# Patient Record
Sex: FEMALE | Race: WHITE | NOT HISPANIC OR LATINO | Employment: PART TIME | URBAN - METROPOLITAN AREA
[De-identification: names, ages, dates, MRNs, and addresses within clinical notes are randomized per-mention and may not be internally consistent; named-entity substitution may affect disease eponyms.]

---

## 2017-07-10 ENCOUNTER — PROCEDURE VISIT (OUTPATIENT)
Dept: OBSTETRICS AND GYNECOLOGY | Facility: CLINIC | Age: 27
End: 2017-07-10

## 2017-07-10 VITALS
HEIGHT: 70 IN | WEIGHT: 148 LBS | TEMPERATURE: 98.1 F | SYSTOLIC BLOOD PRESSURE: 110 MMHG | DIASTOLIC BLOOD PRESSURE: 74 MMHG | BODY MASS INDEX: 21.19 KG/M2

## 2017-07-10 DIAGNOSIS — L94.1 LINEAR MORPHEA: ICD-10-CM

## 2017-07-10 DIAGNOSIS — Z01.419 WELL FEMALE EXAM WITH ROUTINE GYNECOLOGICAL EXAM: Primary | ICD-10-CM

## 2017-07-10 DIAGNOSIS — Z30.431 IUD CHECK UP: ICD-10-CM

## 2017-07-10 PROCEDURE — 99395 PREV VISIT EST AGE 18-39: CPT | Performed by: OBSTETRICS & GYNECOLOGY

## 2017-07-10 NOTE — PROGRESS NOTES
Subjective   Chief Complaint   Patient presents with   • Gynecologic Exam     Pt. is here for her annual preventative exam and pap test. Pt. has no complaints today, doing well.  Pt. has the Paragard.      Frances Francis is a 27 y.o. year old  presenting to be seen for her annual exam. Patient is using a ParaGard IUD for birth control   and has no complaints.  SEXUAL Hx:  She is currently sexually active.  In the past year there has not been new sexual partners.    Condoms are not typically used.  She would not like to be screened for STD's at today's exam.  Current birth control method: IUD - Paraguard.  She is happy with her current method of contraception and does not want to discuss alternative methods of contraception.  MENSTRUAL Hx:  Patient's last menstrual period was 2017 (exact date).  In the past 6 months her cycles have been regular, predictable and occur monthly.  Her menstrual flow is normal.   Each month on average there are roughly 2 day(s) of very heavy flow.    Intermenstrual bleeding is absent.    Post-coital bleeding is absent.  Dysmenorrhea: is not affecting her activities of daily living  PMS: is not affecting her activities of daily living  Her cycles are not a source of concern for her that she wishes to discuss today.  HEALTH Hx:  She exercises regularly:yes.  She wears her seat belt:yes.  She has concerns about domestic violence: no.  OTHER COMPLAINTS:  Nothing else    The following portions of the patient's history were reviewed and updated as appropriate:problem list, current medications, allergies, past family history, past medical history, past social history and past surgical history.    Smoking status: Never Smoker                                                              Smokeless status: Never Used                        Review of Systems  Review of Systems - History obtained from the patient  General ROS: negative  Psychological ROS: negative  ENT ROS: negative  Allergy  "and Immunology ROS: negative  Hematological and Lymphatic ROS: negative  Endocrine ROS: negative  Breast ROS: negative for breast lumps  Respiratory ROS: no cough, shortness of breath, or wheezing  Cardiovascular ROS: no chest pain or dyspnea on exertion  Gastrointestinal ROS: no abdominal pain, change in bowel habits, or black or bloody stools  Genito-Urinary ROS: no dysuria, trouble voiding, or hematuria  Musculoskeletal ROS: negative  Neurological ROS: no TIA or stroke symptoms  Dermatological ROS: positive for skin lesion changes and morphea on right leg        Objective   /74 (BP Location: Right arm, Patient Position: Sitting, Cuff Size: Adult)  Temp 98.1 °F (36.7 °C) (Oral)   Ht 70\" (177.8 cm)  Wt 148 lb (67.1 kg)  LMP 06/30/2017 (Exact Date)  Breastfeeding? No  BMI 21.24 kg/m2    General:  well developed; well nourished  no acute distress   Skin:  No suspicious lesions seen  Discoloration and thickening of the skin on the right thigh in the groin area   Thyroid: normal to inspection and palpation   Breasts:  Examined in supine position  Symmetric without masses or skin dimpling  Nipples normal without inversion, lesions or discharge  There are no palpable axillary nodes   Abdomen: soft, non-tender; no masses  no umbilical or inginual hernias are present  no hepato-splenomegaly   Heart: regular rate and rhythm, S1, S2 normal, no murmur, click, rub or gallop   Lungs: clear to auscultation   Pelvis: Clinical staff was present for exam  External genitalia:  normal appearance of the external genitalia including Bartholin's and [place]'s glands.  :  urethral meatus normal; urethral hypermobility is absent.  Vaginal:  normal pink mucosa without prolapse or lesions.  Cervix:  normal appearance. IUD string present - 2.5 cms in length; Pap was done  Uterus:  normal size, shape and consistency. anteverted;  Adnexa:  normal bimanual exam of the adnexa.  Rectal:  digital rectal exam not performed; anus " visually normal appearing.          Assessment/Plan   Frances was seen today for gynecologic exam.    Diagnoses and all orders for this visit:    Well female exam with routine gynecological exam  -     Liquid-based Pap Smear, Screening    IUD check up    Linear morphea       Return in 1 year    This note was electronically signed.    Jack Cedeno MD   July 10, 2017

## 2017-07-17 ENCOUNTER — TELEPHONE (OUTPATIENT)
Dept: OBSTETRICS AND GYNECOLOGY | Facility: CLINIC | Age: 27
End: 2017-07-17

## 2017-07-17 NOTE — TELEPHONE ENCOUNTER
Notified patient of pa results, Needs a Colpo.  Pt states she will call back and schedule colpo 2 wks after her next period.

## 2017-08-09 ENCOUNTER — OFFICE VISIT (OUTPATIENT)
Dept: OBSTETRICS AND GYNECOLOGY | Facility: CLINIC | Age: 27
End: 2017-08-09

## 2017-08-09 VITALS
WEIGHT: 148 LBS | SYSTOLIC BLOOD PRESSURE: 120 MMHG | DIASTOLIC BLOOD PRESSURE: 66 MMHG | BODY MASS INDEX: 21.24 KG/M2 | HEART RATE: 56 BPM

## 2017-08-09 DIAGNOSIS — R87.613 HGSIL (HIGH GRADE SQUAMOUS INTRAEPITHELIAL LESION) ON PAP SMEAR OF CERVIX: Primary | ICD-10-CM

## 2017-08-09 PROCEDURE — 57455 BIOPSY OF CERVIX W/SCOPE: CPT | Performed by: OBSTETRICS & GYNECOLOGY

## 2017-08-09 RX ORDER — LANOLIN ALCOHOL/MO/W.PET/CERES
400 CREAM (GRAM) TOPICAL DAILY
COMMUNITY

## 2017-08-09 NOTE — PROGRESS NOTES
Colposcopy    Date of procedure:  8/9/2017   Risks and benefits discussed? yes   All questions answered? yes   Consents given by: patient   Written consent obtained? yes   Pre-op indication: HGSIL- moderate and severe dysplasia  Positive HPV, -16 and 18           Procedure documentation:  The cervix was initially viewed colposcopically through a green filter.  The cervix was next bathed in acetic acid.   The findings were as follows:    Transformation zone seen? adequate   Findings: 1. Acetowhite noted at 1 o'clock and 5 o'clock  2. Abnormal vessels noted at 1 o'clock and 5 o'clock   Ectocervical biopsies: taken from 1 o'clock and 5 o'clock.  Monsels solution was applied to the biopsy sites.   Endocervical curettage: not performed   IUD strings seen                Colposcopic Impression: 1. Adequate colposcopy  2. ANA PAULA II  3. Colposcopic findings are consistent with PAP       Plan: Will base further treatment on pathology results  Post biopsy instructions given to patient.  Specimens labelled and sent to pathology.  follow-up in 1 week(s) for Biopsy results, patient has a ParaGard IUD for birth control          This note was electronically signed.    Jack Cedeno MD  August 9, 2017

## 2017-08-15 ENCOUNTER — TELEPHONE (OUTPATIENT)
Dept: OBSTETRICS AND GYNECOLOGY | Facility: CLINIC | Age: 27
End: 2017-08-15

## 2017-08-15 NOTE — TELEPHONE ENCOUNTER
Patient was called about cervical biopsy report and a message was left return the call.    Jack Cedeno MD

## 2017-08-16 ENCOUNTER — OFFICE VISIT (OUTPATIENT)
Dept: OBSTETRICS AND GYNECOLOGY | Facility: CLINIC | Age: 27
End: 2017-08-16

## 2017-08-16 VITALS
DIASTOLIC BLOOD PRESSURE: 62 MMHG | WEIGHT: 149 LBS | HEART RATE: 56 BPM | BODY MASS INDEX: 21.38 KG/M2 | SYSTOLIC BLOOD PRESSURE: 100 MMHG

## 2017-08-16 DIAGNOSIS — D06.9 SEVERE DYSPLASIA OF CERVIX (CIN III): Primary | ICD-10-CM

## 2017-08-16 PROCEDURE — 99212 OFFICE O/P EST SF 10 MIN: CPT | Performed by: OBSTETRICS & GYNECOLOGY

## 2017-08-16 NOTE — PROGRESS NOTES
Subjective   Frances Francis is a 27 y.o. female.     History of Present Illness  Patient has severe dysplasia on colposcopic directed biopsy.  She has a Mirena IUD for birth control.  Patient returns today to discuss options for treatment of severe dysplasia.  The patient will decide between office LEEP cone our outpatient cold knife cone.  She will decide to remove the IUD or leave it in place.  The following portions of the patient's history were reviewed and updated as appropriate: allergies, current medications, past family history, past medical history, past social history, past surgical history and problem list.    Review of Systems    Objective   Physical Exam   Constitutional: She appears well-developed and well-nourished.   Genitourinary: Vagina normal. Pelvic exam was performed with patient supine. No labial fusion. There is no rash, tenderness, lesion or injury on the right labia. There is no rash, tenderness, lesion or injury on the left labia.       Nursing note and vitals reviewed.      Assessment/Plan   Frances was seen today for abnormal pap smear.    Diagnoses and all orders for this visit:    Severe dysplasia of cervix (ANA PAULA III)    Patient has decided to do a LEEP cone in office with removal of the IUD prior to the procedure.  The details of the procedure were discussed.  The patient has decided to schedule the procedure    Jack Cedeno MD

## 2017-09-11 ENCOUNTER — OFFICE VISIT (OUTPATIENT)
Dept: OBSTETRICS AND GYNECOLOGY | Facility: CLINIC | Age: 27
End: 2017-09-11

## 2017-09-11 VITALS
HEIGHT: 70 IN | WEIGHT: 148 LBS | HEART RATE: 64 BPM | SYSTOLIC BLOOD PRESSURE: 112 MMHG | DIASTOLIC BLOOD PRESSURE: 70 MMHG | BODY MASS INDEX: 21.19 KG/M2

## 2017-09-11 DIAGNOSIS — D06.9 SEVERE DYSPLASIA OF CERVIX (CIN III): Primary | ICD-10-CM

## 2017-09-11 DIAGNOSIS — Z30.432 ENCOUNTER FOR IUD REMOVAL: ICD-10-CM

## 2017-09-11 PROCEDURE — 57460 BX OF CERVIX W/SCOPE LEEP: CPT | Performed by: OBSTETRICS & GYNECOLOGY

## 2017-09-11 PROCEDURE — 58301 REMOVE INTRAUTERINE DEVICE: CPT | Performed by: OBSTETRICS & GYNECOLOGY

## 2017-09-11 NOTE — PROGRESS NOTES
IUD Removal    Date of procedure:  9/11/2017    Risks and benefits discussed? yes  All questions answered? yes  Consents given by The patient  Written consent obtained? yes  Reason for removal:  IUD was removed so the patient can undergo a LEEP cone    Local anesthesia used:  yes - 10 cc's of  Meds; anesthesia local: 1% lidocaine with epinephrine    Procedure documentation:    A speculum was placed in order to view the cervix.  A tenaculum did not need to be placed on the anterior cervical lip.  Cervical dilation did not need to be performed in order to access the string.  The IUD string was easily seen.  The string was grasped and the IUD was removed without difficulty.  The IUD did not appear to be adherent to the uterine cavity. It was removed intact.    She tolerated the procedure without any difficulty.     Post procedure instructions: Patient notified to call with heavy bleeding, fever or increasing pain.    Follow up for annual exam and for recheck of Pap smear 6 months    This note was electronically signed    Jack Cedeno MD             Colposcopy with LLETZ    Date of procedure:  9/11/2017   Risks and benefits discussed? yes   All questions answered? yes   Consents given by: patient   Written consent obtained? yes   Pre-op indication: HGSIL- moderate and severe dysplasia        Procedure documentation:  The cervix was initially viewed colposcopically through a green filter.  The cervix was next bathed in acetic acid.   The findings were as follows:    Transformation zone seen? adequate   Findings: 1. Acetowhite noted at 1 o'clock and 6 o'clock  2. Mosaicism noted at 1 o'clock and 6 o'clock   Ectocervical biopsies: not obtained.   Endocervical curettage: not performed               Colposcopic Impression: 1. Adequate colposcopy  2. ANA PAULA III  3. Colposcopic findings are consistent with PAP         LEEP documentation:  The cervix was injected with yes - 10 cc's of  Meds; anesthesia local: 1% lidocaine  with epinephrine.  Lugol's solution was used to stain the ectocervix.  Using a 20 mm LOOP electrode, an ectocervical LLETZ was performed.  Using a 1 mm LOOP electrode, an endocervical LLETZ (tophat) was next performed.  Ball cautery of the base was performed and Monsel's solution was liberally applied.    Follow-up: 1. No tampons, douching or intercourse  2. Recheck in 2 weeks  3. Will call with pathology results in ~ 1 week.       This note was electronically signed.    Jack Cedeno MD.  September 11, 2017

## 2017-09-19 ENCOUNTER — TELEPHONE (OUTPATIENT)
Dept: OBSTETRICS AND GYNECOLOGY | Facility: CLINIC | Age: 27
End: 2017-09-19

## 2017-09-19 NOTE — TELEPHONE ENCOUNTER
Patient was called and reported that the LEEP cone was read as severe dysplasia.  The pathology report was discussed as margins involved and was concerned.  She is returning in December for repeat Pap smear and reinsertion of her IUD.      Jack Cedeno MD

## 2017-12-11 ENCOUNTER — OFFICE VISIT (OUTPATIENT)
Dept: OBSTETRICS AND GYNECOLOGY | Facility: CLINIC | Age: 27
End: 2017-12-11

## 2017-12-11 VITALS
WEIGHT: 148 LBS | HEART RATE: 72 BPM | DIASTOLIC BLOOD PRESSURE: 72 MMHG | BODY MASS INDEX: 21.19 KG/M2 | HEIGHT: 70 IN | SYSTOLIC BLOOD PRESSURE: 110 MMHG

## 2017-12-11 DIAGNOSIS — Z30.09 COUNSELING FOR BIRTH CONTROL REGARDING INTRAUTERINE DEVICE (IUD): ICD-10-CM

## 2017-12-11 DIAGNOSIS — Z98.890 STATUS POST LEEP (LOOP ELECTROSURGICAL EXCISION PROCEDURE) OF CERVIX: ICD-10-CM

## 2017-12-11 DIAGNOSIS — D06.9 SEVERE DYSPLASIA OF CERVIX (CIN III): Primary | ICD-10-CM

## 2017-12-11 PROCEDURE — 99213 OFFICE O/P EST LOW 20 MIN: CPT | Performed by: OBSTETRICS & GYNECOLOGY

## 2017-12-11 NOTE — PROGRESS NOTES
Subjective   Frances Francis is a 27 y.o. female.     History of Present Illness  The patient is 3 months post LEEP cone, path read as severe dysplasia.  Patient returns today for Pap smear.  His Pap smear is normal the patient wants a ParaGard IUD inserted on her next period.      The following portions of the patient's history were reviewed and updated as appropriate: allergies, current medications, past family history, past medical history, past social history, past surgical history and problem list.    Review of Systems   Constitutional: Negative.    Respiratory: Negative.    Cardiovascular: Negative.    Gastrointestinal: Negative.    Genitourinary: Negative.    Musculoskeletal: Negative.    Psychiatric/Behavioral: Negative.        Objective   Physical Exam   Constitutional: She appears well-developed and well-nourished.   Genitourinary: Pelvic exam was performed with patient supine. No labial fusion. There is no rash, tenderness, lesion or injury on the right labia. There is no rash, tenderness, lesion or injury on the left labia. Uterus is not deviated, not enlarged, not fixed and not tender. Cervix exhibits discharge and friability. Cervix exhibits no motion tenderness. Right adnexum displays no mass, no tenderness and no fullness. Left adnexum displays no mass, no tenderness and no fullness. Vaginal discharge found.       Nursing note and vitals reviewed.      Assessment/Plan   Frances was seen today for follow-up.    Diagnoses and all orders for this visit:    Severe dysplasia of cervix (ANA PAULA III)  -     Liquid-based Pap Smear, Screening - ThinPrep Vial, Cervix    Status post LEEP (loop electrosurgical excision procedure) of cervix       Return for IUD insertion if Pap smear is normal  Repeat Pap smear in one year if normal    Jack Cedeno MD

## 2017-12-20 ENCOUNTER — TELEPHONE (OUTPATIENT)
Dept: OBSTETRICS AND GYNECOLOGY | Facility: CLINIC | Age: 27
End: 2017-12-20

## 2017-12-20 NOTE — TELEPHONE ENCOUNTER
The patient was called called and informed her Pap smear was ASCUS after her LEEP cone.  She is interested in IUD and feel that this would be appropriate for birth control.  Call make an appointment for the IUD insertion.    Jack Cedeno MD

## 2018-04-16 ENCOUNTER — OFFICE VISIT (OUTPATIENT)
Dept: OBSTETRICS AND GYNECOLOGY | Facility: CLINIC | Age: 28
End: 2018-04-16

## 2018-04-16 VITALS
SYSTOLIC BLOOD PRESSURE: 120 MMHG | WEIGHT: 156 LBS | BODY MASS INDEX: 22.33 KG/M2 | DIASTOLIC BLOOD PRESSURE: 80 MMHG | HEIGHT: 70 IN

## 2018-04-16 DIAGNOSIS — Z30.430 ENCOUNTER FOR IUD INSERTION: ICD-10-CM

## 2018-04-16 DIAGNOSIS — D06.9 SEVERE DYSPLASIA OF CERVIX (CIN III): Primary | ICD-10-CM

## 2018-04-16 PROCEDURE — 58300 INSERT INTRAUTERINE DEVICE: CPT | Performed by: OBSTETRICS & GYNECOLOGY

## 2018-04-16 RX ORDER — COPPER 313.4 MG/1
INTRAUTERINE DEVICE INTRAUTERINE ONCE
Status: COMPLETED | OUTPATIENT
Start: 2018-04-16 | End: 2018-04-16

## 2018-04-16 RX ADMIN — COPPER 1 EACH: 313.4 INTRAUTERINE DEVICE INTRAUTERINE at 13:48

## 2018-05-14 ENCOUNTER — OFFICE VISIT (OUTPATIENT)
Dept: OBSTETRICS AND GYNECOLOGY | Facility: CLINIC | Age: 28
End: 2018-05-14

## 2018-05-14 VITALS
SYSTOLIC BLOOD PRESSURE: 100 MMHG | HEIGHT: 70 IN | DIASTOLIC BLOOD PRESSURE: 60 MMHG | BODY MASS INDEX: 22.19 KG/M2 | WEIGHT: 155 LBS

## 2018-05-14 DIAGNOSIS — D06.9 SEVERE DYSPLASIA OF CERVIX (CIN III): ICD-10-CM

## 2018-05-14 DIAGNOSIS — Z87.42 HISTORY OF ABNORMAL CERVICAL PAP SMEAR: Primary | ICD-10-CM

## 2018-05-14 DIAGNOSIS — Z30.431 IUD CHECK UP: ICD-10-CM

## 2018-05-14 PROCEDURE — 99213 OFFICE O/P EST LOW 20 MIN: CPT | Performed by: OBSTETRICS & GYNECOLOGY

## 2018-05-14 NOTE — PROGRESS NOTES
Subjective   Frances Francis is a 28 y.o. female is here today for follow-up.    History of Present Illness  Patient returns today for a repeat Pap smear.  She is post-LEEP cone for severe dysplasia of the cervix.  Path report confirmed and margins were clear.  Patient is using a ParaGard IUD for birth control and is having no problems.      The following portions of the patient's history were reviewed and updated as appropriate: allergies, current medications, past family history, past medical history, past social history, past surgical history and problem list.    Review of Systems   Constitutional: Negative.    Respiratory: Negative.    Cardiovascular: Negative.    Gastrointestinal: Negative.    Genitourinary: Negative.    Psychiatric/Behavioral: Negative.        Objective   Physical Exam   Constitutional: She appears well-developed and well-nourished.   Genitourinary: Vagina normal. Pelvic exam was performed with patient supine. No labial fusion. There is no rash, tenderness, lesion or injury on the right labia. There is no rash, tenderness, lesion or injury on the left labia. Uterus is not deviated, not enlarged, not fixed and not tender. Cervix exhibits no motion tenderness, no discharge and no friability. Right adnexum displays no mass, no tenderness and no fullness. Left adnexum displays no mass, no tenderness and no fullness.       Nursing note and vitals reviewed.        Assessment/Plan   Frances was seen today for gynecologic exam.    Diagnoses and all orders for this visit:    History of abnormal cervical Pap smear  -     Liquid-based Pap Smear, Screening    Severe dysplasia of cervix (ANA PAULA III)  -     Liquid-based Pap Smear, Screening    IUD check up        Patient will return in 1 year if Pap smear is normal    Jack Cedeno MD

## 2018-05-18 ENCOUNTER — TELEPHONE (OUTPATIENT)
Dept: OBSTETRICS AND GYNECOLOGY | Facility: CLINIC | Age: 28
End: 2018-05-18

## 2018-05-18 NOTE — TELEPHONE ENCOUNTER
Patient was called and informed that her Pap smear was normal.  She will repeat the Pap smear in one year.    Jack Cedeno MD

## 2021-05-11 ENCOUNTER — TELEPHONE (OUTPATIENT)
Dept: OBSTETRICS AND GYNECOLOGY | Facility: CLINIC | Age: 31
End: 2021-05-11

## 2021-05-11 NOTE — TELEPHONE ENCOUNTER
Alin pt called c/o discharge everyday during and after cycle with an odor and black in color. Requesting to speak to Dr. Ogden. Please contact back    Patient described the discharge.  She is now living in [place].  She is coming back to [place].  She was told to call when she gets back in town and we will get her in the next day.  It was suggested that she should be seen before she is treated.    Jack Cedeno MD

## 2021-06-02 ENCOUNTER — OFFICE VISIT (OUTPATIENT)
Dept: OBSTETRICS AND GYNECOLOGY | Facility: CLINIC | Age: 31
End: 2021-06-02

## 2021-06-02 VITALS
HEIGHT: 70 IN | SYSTOLIC BLOOD PRESSURE: 106 MMHG | BODY MASS INDEX: 21.76 KG/M2 | RESPIRATION RATE: 14 BRPM | WEIGHT: 152 LBS | DIASTOLIC BLOOD PRESSURE: 68 MMHG

## 2021-06-02 DIAGNOSIS — Z86.711 HISTORY OF PULMONARY EMBOLUS (PE): ICD-10-CM

## 2021-06-02 DIAGNOSIS — N76.0 VAGINAL INFECTION: ICD-10-CM

## 2021-06-02 DIAGNOSIS — T19.2XXA RETAINED TAMPON, INITIAL ENCOUNTER: ICD-10-CM

## 2021-06-02 DIAGNOSIS — N76.0 VAGINAL INFECTION: Primary | ICD-10-CM

## 2021-06-02 DIAGNOSIS — Z87.42 HISTORY OF ABNORMAL CERVICAL PAPANICOLAOU SMEAR: ICD-10-CM

## 2021-06-02 DIAGNOSIS — Z30.431 IUD CHECK UP: ICD-10-CM

## 2021-06-02 PROCEDURE — 99203 OFFICE O/P NEW LOW 30 MIN: CPT | Performed by: OBSTETRICS & GYNECOLOGY

## 2021-06-02 NOTE — PROGRESS NOTES
Subjective   Frances Francis is a 31 y.o. female is here today as a self referral.    Chief Complaint   Patient presents with   • Vaginitis     c/o dark vaginal discharge with bad odor ongoing for the past 3-4 months.        History of Present Illness  Patient is still living in [place].  She returns today complaining of vaginal discharge with odor.  She has complained of this for about 4 months.  She is sexually active with same partner.  Patient has a history of severe dysplasia on Pap smear.  She is undergone a LEEP cone.  Her last Pap smear was normal.  Patient has a ParaGard IUD inserted in 2018.  Patient is having no problems with her IUD.  Patient had a pulmonary embolus at the age of 20 etiology undetermined.  Patient had Covid in October and has not had the vaccination.  Adult Visits - 19 to 39 Years - Counseling/Anticipatory Guidance: Nutrition, family planning/contraception, physical activity, healthy weight, injury prevention, misuse of tobacco, alcohol and drugs, sexual behavior and STDs, dental health, mental health, immunizations, screenings For Women: Breast cancer and self breast exams    The following portions of the patient's history were reviewed and updated as appropriate: allergies, current medications, past family history, past medical history, past social history, past surgical history and problem list.    Review of Systems - History obtained from the patient  General ROS: negative  Psychological ROS: negative  ENT ROS: negative  Allergy and Immunology ROS: negative  Hematological and Lymphatic ROS: negative  Endocrine ROS: negative  Breast ROS: negative for breast lumps  Respiratory ROS: no cough, shortness of breath, or wheezing  Cardiovascular ROS: no chest pain or dyspnea on exertion  Gastrointestinal ROS: no abdominal pain, change in bowel habits, or black or bloody stools  Genito-Urinary ROS: no dysuria, trouble voiding, or hematuria  Musculoskeletal ROS: negative  Neurological ROS: no TIA  or stroke symptoms  Dermatological ROS: negative     Objective   General:  well developed; well nourished  no acute distress   Skin:  No suspicious lesions seen   Thyroid: not examined   Breasts:  Examined in supine position  Symmetric without masses or skin dimpling  Nipples normal without inversion, lesions or discharge  There are no palpable axillary nodes   Abdomen: soft, non-tender; no masses  no umbilical or inguinal hernias are present  no hepato-splenomegaly   Heart: regular rate and rhythm, S1, S2 normal, no murmur, click, rub or gallop   Lungs: clear to auscultation   Pelvis: Clinical staff was present for exam  External genitalia:  normal appearance of the external genitalia including Bartholin's and [place]'s glands.  :  urethral meatus normal;  Vaginal:  normal pink mucosa without prolapse or lesions. Retained tampon noted and removed  Cervix:  normal appearance. IUD string present - 1 cms in length; Pap smear and 1 swab was done  Uterus:  normal size, shape and consistency. retroverted;  Adnexa:  normal bimanual exam of the adnexa.  Rectal:  digital rectal exam not performed; anus visually normal appearing.         Assessment/Plan   Diagnoses and all orders for this visit:    1. Vaginal infection (Primary)  -     Gardnerella vaginalis, Trichomonas vaginalis, Candida albicans, DNA - Swab, Vagina; Future    2. History of abnormal cervical Papanicolaou smear  -     Pap IG, HPV-hr; Future    3. IUD check up    4. History of pulmonary embolus (PE)    5. Retained tampon, initial encounter      Return as needed    Jack Cedeno MD

## 2021-06-07 ENCOUNTER — TELEPHONE (OUTPATIENT)
Dept: OBSTETRICS AND GYNECOLOGY | Facility: CLINIC | Age: 31
End: 2021-06-07

## 2021-06-07 NOTE — TELEPHONE ENCOUNTER
Patient was called to discuss vaginal culture results.  There was no answer but a message was left to return the call.    Jack Cedeno MD